# Patient Record
Sex: MALE | Race: WHITE
[De-identification: names, ages, dates, MRNs, and addresses within clinical notes are randomized per-mention and may not be internally consistent; named-entity substitution may affect disease eponyms.]

---

## 2020-01-15 ENCOUNTER — HOSPITAL ENCOUNTER (OUTPATIENT)
Dept: HOSPITAL 92 - TBSIIMAG | Age: 75
Discharge: HOME | End: 2020-01-15
Attending: NEUROLOGICAL SURGERY
Payer: MEDICARE

## 2020-01-15 DIAGNOSIS — M47.812: ICD-10-CM

## 2020-01-15 DIAGNOSIS — M47.12: Primary | ICD-10-CM

## 2020-01-15 PROCEDURE — 72141 MRI NECK SPINE W/O DYE: CPT

## 2020-01-15 NOTE — MRI
Exam: MRI cervical spine without contrast



HISTORY: Cervical spondylosis with myelopathy.



COMPARISON: None



FINDINGS:

 Appropriate T1 marrow signal intensity of the cervical vertebra. Cervical spine vertebral body heigh
t is maintained. There is no fracture. No significant STIR hyperintensity to suggest vertebral body

edema or ligamentous injury.

Visualized brain parenchyma, cervicomedullary junction, cervical cord and the upper thoracic cord hav
e a normal size and signal intensity

Spondylolisthesis:

C2-C3: 2.4 mm of anterolisthesis

C3-C4: 2.2 mm of anterolisthesis

C4-C5: 3.7 mm of anterolisthesis

C5-C6: 2.4 mm of retrolisthesis



C2-C3: No significant central canal stenosis. Bilateral facet hypertrophy. Mild to moderate bilateral
 neural foraminal narrowing.



C3-C4: Broad-based disc osteophyte complex abuts the thecal sac. Subarachnoid space is maintained. Mi
ld central canal stenosis. Moderate bilateral facet hypertrophy as well as degenerative change of

the uncovertebral joints. Moderate bilateral neural foraminal narrowing



C4-C5: Broad-based discussed by complex abuts the thecal sac. Subarachnoid space is maintained. No si
gnificant central canal stenosis. Moderate right facet hypertrophy. Moderate right and left

foraminal narrowing due to uncovertebral hypertrophy as well as right-sided facet hypertrophy.



C5-C6: Broad-based discussed by complex with severe loss of disc space height. Mild central canal kaycee
nosis. Moderate to severe right and left neural foraminal narrowing.



C6-C7: Broad-based discussed by complex abuts the thecal sac. Mild central canal stenosis. Moderate t
o severe bilateral neural foraminal



C7-T1: No significant central canal stenosis. Mild right neural foraminal narrowing. Left neural fora
men is patent.



T1-T2: Sagittal images demonstrate grade 1 anterolisthesis of T1 upon T2



IMPRESSION:

 Degenerative changes of the cervical spine as described above.



Transcribed Date/Time: 1/15/2020 4:27 PM



Reported By: Mouna Reddy 

Electronically Signed:  1/15/2020 4:33 PM

## 2020-02-24 ENCOUNTER — HOSPITAL ENCOUNTER (OUTPATIENT)
Dept: HOSPITAL 92 - LABBT | Age: 75
Discharge: HOME | End: 2020-02-24
Attending: NEUROLOGICAL SURGERY
Payer: MEDICARE

## 2020-02-24 DIAGNOSIS — M48.061: ICD-10-CM

## 2020-02-24 DIAGNOSIS — Z01.818: Primary | ICD-10-CM

## 2020-02-24 LAB
APTT PPP: 29.5 SEC (ref 22.9–36.1)
HGB BLD-MCNC: 16 G/DL (ref 14–18)
INR PPP: 1
MCH RBC QN AUTO: 29 PG (ref 27–31)
MCV RBC AUTO: 91.1 FL (ref 78–98)
PLATELET # BLD AUTO: 196 THOU/UL (ref 130–400)
PROTHROMBIN TIME: 13.3 SEC (ref 12–14.7)
RBC # BLD AUTO: 5.52 MILL/UL (ref 4.7–6.1)
WBC # BLD AUTO: 5.7 THOU/UL (ref 4.8–10.8)

## 2020-02-24 PROCEDURE — 85610 PROTHROMBIN TIME: CPT

## 2020-02-24 PROCEDURE — 85027 COMPLETE CBC AUTOMATED: CPT

## 2020-02-24 PROCEDURE — 85730 THROMBOPLASTIN TIME PARTIAL: CPT

## 2020-02-24 PROCEDURE — 93005 ELECTROCARDIOGRAM TRACING: CPT

## 2020-02-24 PROCEDURE — 93010 ELECTROCARDIOGRAM REPORT: CPT

## 2020-02-24 NOTE — EKG
Test Reason : 

Blood Pressure : ***/*** mmHG

Vent. Rate : 086 BPM     Atrial Rate : 086 BPM

   P-R Int : 138 ms          QRS Dur : 094 ms

    QT Int : 356 ms       P-R-T Axes : 028 -07 024 degrees

   QTc Int : 426 ms

 

Sinus rhythm with occasional Premature ventricular complexes

Otherwise normal ECG

No previous ECGs available

Confirmed by DR. ANA CRISTINA WALDEN (3) on 2/24/2020 4:31:13 PM

 

Referred By:  TOUSSAINT           Confirmed By:DR. ANA CRISTINA WALDEN

## 2020-02-26 NOTE — HP
ANTICIPATED DATE OF SURGERY:  02/27/2020, for a foramin facet laminectomy, case

#[id number]. 



CHIEF COMPLAINT:  "My back and legs hurt."



HISTORY OF PRESENT ILLNESS:  Reza Sousa is a 74-year-old male with a history 
of

Guillain Horner syndrome, stroke, and chronic back pain, who comes in for 
evaluation

of back pain as well as radiating leg discomfort.  Mr. Sousa had this back 
and leg

trouble for a number of years.  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 
company and over

the years, he has worsening back issues.  At times, he has been managed with

injections.  He has experience his back going out for weeks where he could not

work.  He has done some modifications to his activities to get the pain to go 
away 

and he has taken a number of medications over decades. None of this has made 

permanent difference.  He is here to see if decompression would be an option 
for him.  

He has questions about VertiFlex device as well as laminectomy.  Mr. Sousa 

describes having Guillain-Horner syndrome almost 30 years ago.  He ended having 
a 

tracheostomy from that and paralysis from the neck down, only to have all

his functions return slowly thereafter.  In March 2018, he stated that he 
suffered a

stroke and was seen at the [facility] in [place].  They tried a catheter-based

treatment, but the vessels were too small to retrieve clot, or a stent.  He

recovered from both of these episodes.  He has been able to work and be 
productive

at work.  Mr. Sousa has some urinary urgency, but no stephanie incontinence.  He 
has

leg symptoms that are more prominent on the right than the left currently, but 
they

change from side to side.  There is residual numbness from the Guillain-Horner

syndrome in both his feet. 



PAST MEDICAL HISTORY:  Arthritis, allergies, blood clots in the past, chronic 
pain,

depression, heart disease, hypertension, stroke, and Guillain-Barr syndrome. 



PAST SURGICAL HISTORY:  Shoulder replacement, knee surgery, and herniorrhaphy. 



HOSPITALIZATION: 1991 for Guillain-Horner.



MEDICATIONS:  Currently, there are no available medications in the chart.  This

includes Eliquis. 



ALLERGIES: ALEVE adverse reaction. 



FAMILY HISTORY:  There is family history of heart disease in mother.  Both his

parents have passed away.  His children are healthy and alive. 



SOCIAL HISTORY:  Mr. Sousa has quit smoking.  He does not use illicit drugs or

alcohol.  He is  and lives with his spouse. 



REVIEW OF SYSTEMS:  Otherwise, negative.



PHYSICAL EXAMINATION:

VITAL SIGNS:  5 feet tall, weighs 200 pounds. 

GENERAL:  He is awake and alert on exam. 

NEUROLOGIC:  His speech is fluent without dysphasia or dysarthria.  Cranial 
nerves

work well.  Motor examination, there is weakness in the right biceps compared 
to the

left.  There is also weakness in the anterior tib bilaterally, left more than 
right.

 On sensory exam, there is Tinel at the wrist on the right side.  There is no

dermatomal sensory loss in the upper extremity currently.  There is an L5 
sensory

alteration on the left compared to the right.  The other dermatomes are 
symmetric.

The knee jerk on the left is hypoactive and the ankle jerk is absent.  The knee 
jerk

on the right is hyperactive and abnormal.  There is no clonus.  There is an

equivocal toe on the right and downward toe on the left. 



IMAGE STUDIES:  MRI scan showed degenerative spine disease, multiple 
interspaces in

the lumbar and lower thoracic spine.  There is a canal stenosis that is quite 
severe

at L3-L4 and L4-L5.  There is foraminal stenosis that is severe, more so on the 
left

than the right.  Flexion-extension x-rays do not show instability.  There is no

current image of the cervical spine. 



IMPRESSION:  

1. Possible myopathy with some hand numbness and brisk reflexes on the right.

2. Neurogenic claudication from severe lumbar stenosis.

3. Anticoagulation use. 



Mr. Sousa has tried chiropractor manipulation.  He has tried medication.  He 
has

modified his activities.  He has had injections.  His pain continues despite 
years

of nonsurgical treatment.  He was offered decompression laminectomy and he would

like to proceed with surgery. 



PLAN: Foramin facet laminectomy. Mr. Sousa will need to be off Eliquis one

week before surgery and two weeks after surgery.  He can remain on a baby 
aspirin.

If a full-strength aspirin is necessary, he will have to stay in the hospital 
after

surgery. He will need to be cleared by anesthesia prior to surgery.



INFORMED CONSENT:  I discussed indications, risks, benefits, alternatives, and

expected outcomes from surgery.  The risks discussed include, but are not 
limited

to, bleeding, infection, CSF leak, nerve root damage, cauda equina injury,

paralysis, incontinence, wheelchair dependency, major blood vessel injury,

cardiopulmonary complications of anesthesia, and death.  Long-term risks 
include the

need for future surgery and spinal instability.  The patient understands the

discussion and is willing to proceed.







Job ID:  [id number]



MTDD

## 2020-02-27 ENCOUNTER — HOSPITAL ENCOUNTER (OUTPATIENT)
Dept: HOSPITAL 92 - SDC | Age: 75
LOS: 1 days | Discharge: HOME | End: 2020-02-28
Attending: NEUROLOGICAL SURGERY
Payer: MEDICARE

## 2020-02-27 VITALS — BODY MASS INDEX: 31.6 KG/M2

## 2020-02-27 DIAGNOSIS — F41.9: ICD-10-CM

## 2020-02-27 DIAGNOSIS — F32.9: ICD-10-CM

## 2020-02-27 DIAGNOSIS — M48.062: Primary | ICD-10-CM

## 2020-02-27 DIAGNOSIS — Z79.01: ICD-10-CM

## 2020-02-27 DIAGNOSIS — Z86.73: ICD-10-CM

## 2020-02-27 DIAGNOSIS — Z87.891: ICD-10-CM

## 2020-02-27 DIAGNOSIS — Z79.82: ICD-10-CM

## 2020-02-27 DIAGNOSIS — Z88.6: ICD-10-CM

## 2020-02-27 DIAGNOSIS — I48.0: ICD-10-CM

## 2020-02-27 DIAGNOSIS — I10: ICD-10-CM

## 2020-02-27 DIAGNOSIS — E66.9: ICD-10-CM

## 2020-02-27 DIAGNOSIS — Z79.899: ICD-10-CM

## 2020-02-27 PROCEDURE — S0020 INJECTION, BUPIVICAINE HYDRO: HCPCS

## 2020-02-27 PROCEDURE — 76000 FLUOROSCOPY <1 HR PHYS/QHP: CPT

## 2020-02-27 PROCEDURE — 01NB0ZZ RELEASE LUMBAR NERVE, OPEN APPROACH: ICD-10-PCS | Performed by: NEUROLOGICAL SURGERY

## 2020-02-27 RX ADMIN — CEFAZOLIN SODIUM SCH MLS: 2 SOLUTION INTRAVENOUS at 20:24

## 2020-02-27 NOTE — OP
DATE OF PROCEDURE:  02/27/2020



ASSISTANT:  Ambrocio Mike PA-C



PREOPERATIVE INDICATION:  Treat pain and prevent neurological deterioration.



PREOPERATIVE DIAGNOSIS:  Lumbar stenosis with neurogenic claudication and foraminal

disease at L3-L4 and L4-L5. 



POSTOPERATIVE DIAGNOSIS:  Lumbar stenosis with neurogenic claudication and foraminal

disease at L3-L4 and L4-L5. 



PROCEDURES PERFORMED:  Decompressive laminectomy, medial facetectomy, and

foraminotomy, L3-L4. 



PREOPERATIVE MEDICATIONS:  Ancef 2 g IV.



DRAIN NUMBER:  Zero.



DRAIN TYPE:  None.



DESCRIPTION OF PROCEDURE:  The patient was brought to the operating room.  General

endotracheal anesthesia was induced.  The patient was positioned on the operating

table prone with his chest and hips supported by gel-filled chest rolls.  A lateral

fluoro radiograph was used to plan our incision.  The lumbar skin was sterilely

prepped and draped.  We opened with a 10 blade knife and controlled bleeding with

bipolar and monopolar cautery.  We used monopolar cautery to dissect through

subcutaneous tissues to the thoracodorsal fascia.  We incised the fascia in the

midline and reflected the paraspinal muscles off the spinous process and lamina of

L3, L4 and the superior portion of L5.  A lateral fluoro radiograph confirmed the

levels upon which we were operating.  We placed self-retaining retractors.  We then

removed the spinous process of L3, L4 and the superior portion of L5 with Adson

rongeurs.  With the high-speed drill and a nelly bit, we thinned the lamina of L3,

L4 and the superior portion of L5.  Kerrison rongeurs were used to fashion a

laminectomy down the midline.  The laminectomy was completed from the pedicles of L3

all the way past the pedicles of L5.  In order to decompress the lateral recesses,

we had to do significant medial facetectomies at L3-L4 and L4-L5 bilaterally.  A

Snowden ball probe was inhibited in exiting the spine around the left greater than

right nerve roots.  Foraminotomies were performed over the nerve roots until ball

probe could pass through each foramen around the L3, L4, and L5 nerve roots.  With

our decompression secured, we turned our attention to hemostasis.  Ventral epidural

bleeding was controlled with gentle bipolar cautery.  We waxed the bone edges.  We

infused local anesthetic in the paraspinal muscles.  We irrigated copiously with

bacitracin irrigation.  We closed the wound in anatomic layers and we applied a

sterile dressing.  This was a clean case, no contamination. 







Job ID:  [id number]

## 2020-02-28 VITALS — TEMPERATURE: 97.6 F

## 2020-02-28 VITALS — SYSTOLIC BLOOD PRESSURE: 125 MMHG | DIASTOLIC BLOOD PRESSURE: 75 MMHG

## 2020-02-28 RX ADMIN — CEFAZOLIN SODIUM SCH MLS: 2 SOLUTION INTRAVENOUS at 03:45

## 2020-02-28 NOTE — PRG
DATE OF SERVICE:  02/28/2020



 Reza Sousa is one day out from a lumbar decompression.  He had no

vascular events overnight.  He complains of some soreness in his back when he 
gets

up and moves, but the leg pain is better. 



Among the electronically recorded vital signs, I do not see any fevers.

Oxygen saturation has been in the low 90s, but it was prior to the operation 
itself.

 On examination, there is good neurological function in lower extremities. 



Mr. Sousa had some urinary difficulty yesterday.  His Guillain-Kenner episode 
years

ago, has left him with some residual deficits and this may be one of them, 
because

he had some incontinence briefs on prior to surgery. 



Mr. Sousa is satisfied that his urinary function has returned to his baseline,

when the other activities of daily living are safe, then he can be discharged 
home.

He can restart aspirin 81 mg daily tomorrow, but he should not restart Plavix or

Pradaxa for 13 more days. 







Job ID:  [id number]



[facility]

## 2020-02-28 NOTE — PDOC.HOSPP
- Subjective


Encounter Date: 02/28/20


Encounter Time: 08:00


Subjective: 





Patient seen and examined. No new complaints. No overnight events





- Objective


Vital Signs & Weight: 


 Vital Signs (12 hours)











  Temp Pulse Resp BP BP Pulse Ox


 


 02/28/20 09:28   99   125/75  


 


 02/28/20 08:00       95


 


 02/28/20 07:45  97.6 F  99  16   125/75  95


 


 02/28/20 04:00  98.1 F  94  20   120/73  92 L








 Weight











Weight                         213 lb 13.574 oz














I&O: 


 











 02/27/20 02/28/20 02/29/20





 06:59 06:59 06:59


 


Intake Total  1475 630


 


Output Total  1000 300


 


Balance  475 330














Hospitalist ROS





- Review of Systems


ENT: denies: ear pain, ear discharge, nose pain, nose discharge, nose congestion

, mouth pain, mouth swelling, throat pain, throat swelling, other


Respiratory: denies: cough, dry, shortness of breath, hemoptysis, SOB with 

excertion, pleuritic pain, sputum, wheezing, other


Cardiovascular: denies: chest pain, palpitations, orthopnea, paroxysmal noc. 

dyspnea, edema, light headedness, other


Gastrointestinal: denies: nausea, vomiting, abdominal pain, diarrhea, 

constipation, melena, hematochezia, other


Genitourinary: denies: dysuria, frequency, incontinence, hematuria, retention, 

other


Musculoskeletal: denies: neck pain, shoulder pain, arm pain, back pain, hand 

pain, leg pain, foot pain, other





- Exam


General Appearance: NAD, awake alert


Eye: PERRL, anicteric sclera


ENT: normocephalic atraumatic, no oropharyngeal lesions


Neck: supple, symmetric, no JVD, no thyromegaly


Heart: RRR, no murmur, no gallops, no rubs


Respiratory: CTAB, no wheezes, no rales, no ronchi


Gastrointestinal: soft, non-tender, non-distended, normal bowel sounds


Extremities: no cyanosis, no clubbing, no edema


Skin: normal turgor, no lesions


Neurological: no focal deficits


Musculoskeletal: normal tone, normal strength


Psychiatric: normal affect, normal behavior





Hosp A/P


(1) S/P lumbar laminectomy


Code(s): Z98.890 - OTHER SPECIFIED POSTPROCEDURAL STATES   Status: Acute   





(2) PAF (paroxysmal atrial fibrillation)


Code(s): I48.0 - PAROXYSMAL ATRIAL FIBRILLATION   Status: Chronic   





(3) Hypertension


Code(s): I10 - ESSENTIAL (PRIMARY) HYPERTENSION   Status: Chronic   





(4) Dyslipidemia (high LDL; low HDL)


Code(s): E78.5 - HYPERLIPIDEMIA, UNSPECIFIED   Status: Chronic   





(5) Anxiety and depression


Code(s): F41.9 - ANXIETY DISORDER, UNSPECIFIED; F32.9 - MAJOR DEPRESSIVE 

DISORDER, SINGLE EPISODE, UNSPECIFIED   Status: Chronic   





(6) Obesity (BMI 30.0-34.9)


Code(s): E66.9 - OBESITY, UNSPECIFIED   Status: Chronic   





- Plan


old records reviewed/req





medication reviewed and continue to provide supportive care


pt is planned for DC by primary team


pt is medically stable


OK to DC and will sign off

## 2020-09-08 ENCOUNTER — HOSPITAL ENCOUNTER (OUTPATIENT)
Dept: HOSPITAL 92 - RAD | Age: 75
Discharge: HOME | End: 2020-09-08
Attending: NEUROLOGICAL SURGERY
Payer: MEDICARE

## 2020-09-08 DIAGNOSIS — M47.816: ICD-10-CM

## 2020-09-08 DIAGNOSIS — M54.5: Primary | ICD-10-CM

## 2020-09-08 PROCEDURE — 72120 X-RAY BEND ONLY L-S SPINE: CPT

## 2020-09-08 NOTE — RAD
LUMBAR SPINE SERIES FOUR VIEWS TO INCLUDE FLEXION AND EXTENSION:

9/8/20

 

There is some wedge shaped deformity to the L1 vertebral body. Cupping to the superior end plates of 
other thoracic vertebral bodies probably related to osteoporosis. Marked degenerative changes are see
n with severe facet changes. I do not see any abnormal motion on the flexion or extension views. Ther
e is retrolisthesis at the L1-2, L2-3, and L3-4 levels. 

 

IMPRESSION: 

Arthritic changes of the spine as above. 

 

POS: STEVE

## 2020-10-06 ENCOUNTER — HOSPITAL ENCOUNTER (OUTPATIENT)
Dept: HOSPITAL 92 - BICCT | Age: 75
Discharge: HOME | End: 2020-10-06
Attending: NEUROLOGICAL SURGERY
Payer: MEDICARE

## 2020-10-06 DIAGNOSIS — M41.9: ICD-10-CM

## 2020-10-06 DIAGNOSIS — M48.07: ICD-10-CM

## 2020-10-06 DIAGNOSIS — M47.816: ICD-10-CM

## 2020-10-06 DIAGNOSIS — M48.061: Primary | ICD-10-CM

## 2020-10-06 DIAGNOSIS — M51.36: ICD-10-CM

## 2020-10-06 DIAGNOSIS — M89.9: ICD-10-CM

## 2020-10-06 PROCEDURE — 72131 CT LUMBAR SPINE W/O DYE: CPT
